# Patient Record
Sex: FEMALE
[De-identification: names, ages, dates, MRNs, and addresses within clinical notes are randomized per-mention and may not be internally consistent; named-entity substitution may affect disease eponyms.]

---

## 2024-07-12 DIAGNOSIS — Z13.83 ENCOUNTER FOR SCREENING FOR RESPIRATORY DISORDER NEC: ICD-10-CM

## 2024-07-12 PROBLEM — Z00.00 ENCOUNTER FOR PREVENTIVE HEALTH EXAMINATION: Status: ACTIVE | Noted: 2024-07-12

## 2024-07-26 ENCOUNTER — APPOINTMENT (OUTPATIENT)
Dept: PULMONOLOGY | Facility: CLINIC | Age: 59
End: 2024-07-26
Payer: COMMERCIAL

## 2024-07-26 VITALS
HEART RATE: 64 BPM | DIASTOLIC BLOOD PRESSURE: 81 MMHG | BODY MASS INDEX: 30.4 KG/M2 | SYSTOLIC BLOOD PRESSURE: 120 MMHG | HEIGHT: 61 IN | WEIGHT: 161 LBS

## 2024-07-26 DIAGNOSIS — J45.20 MILD INTERMITTENT ASTHMA, UNCOMPLICATED: ICD-10-CM

## 2024-07-26 DIAGNOSIS — Z78.9 OTHER SPECIFIED HEALTH STATUS: ICD-10-CM

## 2024-07-26 DIAGNOSIS — Z86.79 PERSONAL HISTORY OF OTHER DISEASES OF THE CIRCULATORY SYSTEM: ICD-10-CM

## 2024-07-26 PROCEDURE — ZZZZZ: CPT

## 2024-07-26 PROCEDURE — 99205 OFFICE O/P NEW HI 60 MIN: CPT | Mod: 25

## 2024-07-26 PROCEDURE — 94060 EVALUATION OF WHEEZING: CPT

## 2024-07-26 PROCEDURE — 94726 PLETHYSMOGRAPHY LUNG VOLUMES: CPT

## 2024-07-26 PROCEDURE — 94729 DIFFUSING CAPACITY: CPT

## 2024-07-26 RX ORDER — ALBUTEROL SULFATE 90 UG/1
108 (90 BASE) INHALANT RESPIRATORY (INHALATION)
Qty: 1 | Refills: 6 | Status: ACTIVE | COMMUNITY
Start: 2024-07-26 | End: 1900-01-01

## 2024-07-27 NOTE — PHYSICAL EXAM
[No Acute Distress] : no acute distress [Well Nourished] : well nourished [Well Groomed] : well groomed [No Deformities] : no deformities [Well Developed] : well developed [Normal Oropharynx] : normal oropharynx [Normal Appearance] : normal appearance [Supple] : supple [No Neck Mass] : no neck mass [Normal Rate/Rhythm] : normal rate/rhythm [Normal S1, S2] : normal s1, s2 [No Resp Distress] : no resp distress [No Acc Muscle Use] : no acc muscle use [Normal Rhythm and Effort] : normal rhythm and effort [Clear to Auscultation Bilaterally] : clear to auscultation bilaterally [Benign] : benign [Soft] : soft [Normal Gait] : normal gait [No Clubbing] : no clubbing [No Cyanosis] : no cyanosis [No Edema] : no edema [FROM] : FROM [Normal Color/ Pigmentation] : normal color/ pigmentation [No Focal Deficits] : no focal deficits [No Motor Deficits] : no motor deficits [Oriented x3] : oriented x3 [Normal Mood] : normal mood [Normal Affect] : normal affect [TextBox_11] : NCAT, EOMI, anicteric, MMM, nares clear, no thrush

## 2024-07-27 NOTE — CONSULT LETTER
[Dear  ___] : Dear  [unfilled], [Consult Letter:] : I had the pleasure of evaluating your patient, [unfilled]. [Please see my note below.] : Please see my note below. [Consult Closing:] : Thank you very much for allowing me to participate in the care of this patient.  If you have any questions, please do not hesitate to contact me. [Sincerely,] : Sincerely, [FreeTextEntry3] : Ramana Berg MD, FACP, FCCP Division of Pulmonary, Critical Care, and Sleep Medicine Associate Professor of Medicine  [employer] at [facility]

## 2024-07-27 NOTE — ASSESSMENT
[FreeTextEntry1] : 58F mild intermittent asthma presenting for initial pulmonary evaluation.  #Mild Intermittent Asthma without complication - PFTs reviewed with patient - there is evidence of small airway disease - Refill for Qvar and Albuterol provided - patient presently not taking any medication regularly and given absence of symptoms it is reasonable to continue monitoring for now - It would be helpful to have prior PFTs for comparison and I have asked patient to sign a records release request - No wheezing or respiratory distress on exam. No role for oral corticosteroids at this time  #Health Maintenance Spirometry: Reviewed Smoking status: Never smoker  Pneumococcal vaccination status: Patient thinks this has been done but does not have records  [place] Sleepiness Scale:  #Follow-up in 6 months or sooner as needed - All questions answered - Extensive discussion with patient about asthma symptoms, diagnostic testing, and treatment options   The above plan was discussed with JAILENE MIRELES  in detail. Patient verbalized understanding and agrees with plan as detailed above. Patient was provided education and counselling on current diagnosis/symptoms, diagnostic work up, treatment options and potential side effects of any prescribed therapy/therapies. JAILENE  was advised to call our clinic at [phone] for any new or worsening symptoms, or with any questions or concerns. In case of acute onset of respiratory symptoms or worsening presentation, patient was advised to present to nearest emergency room for further evaluation. JAILENE  expressed understanding and all questions/concerns were addressed.

## 2024-07-27 NOTE — ASSESSMENT
[FreeTextEntry1] : 58F mild intermittent asthma presenting for initial pulmonary evaluation.  #Mild Intermittent Asthma without complication - PFTs reviewed with patient - there is evidence of small airway disease - Refill for Qvar and Albuterol provided - patient presently not taking any medication regularly and given absence of symptoms it is reasonable to continue monitoring for now - It would be helpful to have prior PFTs for comparison and I have asked patient to sign a records release request - No wheezing or respiratory distress on exam. No role for oral corticosteroids at this time  #Health Maintenance Spirometry: Reviewed Smoking status: Never smoker  Pneumococcal vaccination status: Patient thinks this has been done but does not have records  [place] Sleepiness Scale:  #Follow-up in 6 months or sooner as needed - All questions answered - Extensive discussion with patient about asthma symptoms, diagnostic testing, and treatment options   The above plan was discussed with JAILENE MIRELES  in detail. Patient verbalized understanding and agrees with plan as detailed above. Patient was provided education and counselling on current diagnosis/symptoms, diagnostic work up, treatment options and potential side effects of any prescribed therapy/therapies. JAILENE  was advised to call our clinic at [phone] for any new or worsening symptoms, or with any questions or concerns. In case of acute onset of respiratory symptoms or worsening presentation, patient was advised to present to nearest emergency room for further evaluation. JAILENE  expressed understanding and all questions/concerns were addressed.

## 2024-07-27 NOTE — CONSULT LETTER
[Dear  ___] : Dear  [unfilled], [Consult Letter:] : I had the pleasure of evaluating your patient, [unfilled]. [Please see my note below.] : Please see my note below. [Consult Closing:] : Thank you very much for allowing me to participate in the care of this patient.  If you have any questions, please do not hesitate to contact me. [Sincerely,] : Sincerely, [FreeTextEntry3] : Ramana Berg MD, FACP, FCCP Division of Pulmonary, Critical Care, and Sleep Medicine Associate Professor of Medicine  [employer] at [facility]

## 2024-07-27 NOTE — HISTORY OF PRESENT ILLNESS
[Never] : never [Difficulty Breathing During Exertion] : dyspnea on exertion [Feelings Of Weakness On Exertion] : exercise intolerance [Cough] : coughing [Wheezing] : wheezing [Nasal Passage Blockage (Stuffiness)] : edema [Nonspecific Pain, Swelling, And Stiffness] : chest pain [Fever] : fever [0  -  Nothing at all] : 0, nothing at all [TextBox_4] : 58F mild intermittent asthma presenting for initial pulmonary evaluation.  The patient was previously followed by Dr. Prasanna Magallon who is now retired. She was managed with Qvar and Albuterol PRN for her asthma. She has not had any hospitalizations or urgent care visits. She has never required intubation for her asthma. Her asthma symptoms are very mild and she does not use any of her medications regularly. She is presenting for evaluation and medication refills just in case. - We do not have records from Dr. Guillen's office or her PMD (Dr. Guerrero - [facility]) available for review today. Patient was requested to sign a records release request in hopes that we can try and obtain these records.   She denies cough, sputum production, wheezing, or hemoptysis. She denies fevers, chills, sick contacts, chest pain, or palpitations. She denies any weight loss or night sweats.  She is a never smoker. She is presently taking care of her  who has cancer.  She thinks she may have had a pneumococcal vaccine in the past but is unsure.  PFTs: 7/26/24 - FEV1 2.19L (95%), FVC 3.15L (109%), FEV1/FVC 69%, FEF 25-75 52%, TLC 97%, RV 67%, RV/TLC 27%, DLCO 97% - no significant BD response (28% BD response in FEF 25-75)

## 2024-07-27 NOTE — REVIEW OF SYSTEMS
[Seasonal Allergies] : seasonal allergies [Fever] : no fever [Fatigue] : no fatigue [EDS] : no EDS [Chills] : no chills [Dry Eyes] : no dry eyes [Epistaxis] : no epistaxis [Sore Throat] : no sore throat [Nasal Congestion] : no nasal congestion [Postnasal Drip] : no postnasal drip [Cough] : no cough [Hemoptysis] : no hemoptysis [Chest Tightness] : no chest tightness [Sputum] : no sputum [Frequent URIs] : no frequent URIs [Dyspnea] : no dyspnea [Wheezing] : no wheezing [Chest Discomfort] : no chest discomfort [Claudication] : no claudication [Edema] : no edema [Watery Eyes] : no watery eyes [Itchy Eyes] : no itchy eyes [GERD] : no gerd [Abdominal Pain] : no abdominal pain [Nausea] : no nausea [Vomiting] : no vomiting [Arthralgias] : no arthralgias [Myalgias] : no myalgias [Headache] : no headache [Focal Weakness] : no focal weakness [Seizures] : no seizures [Head Injury] : no head injury [Depression] : no depression [Diabetes] : no diabetes [Anxiety] : no anxiety [Thyroid Problem] : no thyroid problem [Obesity] : no obesity

## 2024-07-27 NOTE — HISTORY OF PRESENT ILLNESS
[Never] : never [Difficulty Breathing During Exertion] : dyspnea on exertion [Feelings Of Weakness On Exertion] : exercise intolerance [Cough] : coughing [Wheezing] : wheezing [Nasal Passage Blockage (Stuffiness)] : edema [Nonspecific Pain, Swelling, And Stiffness] : chest pain [Fever] : fever [0  -  Nothing at all] : 0, nothing at all [TextBox_4] : 58F mild intermittent asthma presenting for initial pulmonary evaluation.  The patient was previously followed by Dr. Prasanna Magallon who is now retired. She was managed with Qvar and Albuterol PRN for her asthma. She has not had any hospitalizations or urgent care visits. She has never required intubation for her asthma. Her asthma symptoms are very mild and she does not use any of her medications regularly. She is presenting for evaluation and medication refills just in case. - We do not have records from Dr. Guillen's office or her PMD (Dr. Guerrero - [facility]) available for review today. Patient was requested to sign a records release request in hopes that we can try and obtain these records.   She denies cough, sputum production, wheezing, or hemoptysis. She denies fevers, chills, sick contacts, chest pain, or palpitations. She denies any weight loss or night sweats.  She is a never smoker. She is presently taking care of her  who has cancer.  She thinks she may have had a pneumococcal vaccine in the past but is unsure.  PFTs: 7/26/24 - FEV1 2.19L (95%), FVC 3.15L (109%), FEV1/FVC 69%, FEF 25-75 52%, TLC 97%, RV 67%, RV/TLC 27%, DLCO 97% - no significant BD response (28% BD response in FEF 25-75)

## 2024-07-27 NOTE — REVIEW OF SYSTEMS
[Seasonal Allergies] : seasonal allergies [Fever] : no fever [Fatigue] : no fatigue [EDS] : no EDS [Chills] : no chills [Dry Eyes] : no dry eyes [Epistaxis] : no epistaxis [Sore Throat] : no sore throat [Nasal Congestion] : no nasal congestion [Postnasal Drip] : no postnasal drip [Cough] : no cough [Hemoptysis] : no hemoptysis [Chest Tightness] : no chest tightness [Sputum] : no sputum [Frequent URIs] : no frequent URIs [Dyspnea] : no dyspnea [Wheezing] : no wheezing [Chest Discomfort] : no chest discomfort [Claudication] : no claudication [Edema] : no edema [Watery Eyes] : no watery eyes [Itchy Eyes] : no itchy eyes [GERD] : no gerd [Abdominal Pain] : no abdominal pain [Nausea] : no nausea [Vomiting] : no vomiting [Arthralgias] : no arthralgias [Myalgias] : no myalgias [Headache] : no headache [Focal Weakness] : no focal weakness [Seizures] : no seizures [Head Injury] : no head injury [Depression] : no depression [Anxiety] : no anxiety [Diabetes] : no diabetes [Thyroid Problem] : no thyroid problem [Obesity] : no obesity

## 2024-07-29 RX ORDER — BECLOMETHASONE DIPROPIONATE HFA 80 UG/1
80 AEROSOL, METERED RESPIRATORY (INHALATION)
Qty: 1 | Refills: 6 | Status: DISCONTINUED | COMMUNITY
Start: 2024-07-26 | End: 2024-07-29

## 2024-07-30 RX ORDER — FLUTICASONE PROPIONATE 110 UG/1
110 AEROSOL, METERED RESPIRATORY (INHALATION) TWICE DAILY
Qty: 1 | Refills: 3 | Status: DISCONTINUED | COMMUNITY
Start: 2024-07-29 | End: 2024-07-30

## 2024-07-30 RX ORDER — BUDESONIDE 90 UG/1
90 AEROSOL, POWDER RESPIRATORY (INHALATION)
Qty: 1 | Refills: 3 | Status: ACTIVE | COMMUNITY
Start: 2024-07-30 | End: 1900-01-01

## 2025-04-25 ENCOUNTER — APPOINTMENT (OUTPATIENT)
Dept: PULMONOLOGY | Facility: CLINIC | Age: 60
End: 2025-04-25
Payer: COMMERCIAL

## 2025-04-25 VITALS
BODY MASS INDEX: 32.3 KG/M2 | RESPIRATION RATE: 16 BRPM | SYSTOLIC BLOOD PRESSURE: 136 MMHG | WEIGHT: 171.06 LBS | OXYGEN SATURATION: 98 % | HEIGHT: 61 IN | DIASTOLIC BLOOD PRESSURE: 84 MMHG | HEART RATE: 69 BPM

## 2025-04-25 PROCEDURE — 99214 OFFICE O/P EST MOD 30 MIN: CPT

## 2025-04-25 PROCEDURE — G2211 COMPLEX E/M VISIT ADD ON: CPT | Mod: NC

## 2025-04-28 DIAGNOSIS — J45.20 MILD INTERMITTENT ASTHMA, UNCOMPLICATED: ICD-10-CM

## 2025-04-28 RX ORDER — MOMETASONE FUROATE 100 UG/1
100 AEROSOL RESPIRATORY (INHALATION)
Qty: 1 | Refills: 5 | Status: ACTIVE | COMMUNITY
Start: 2025-04-28 | End: 1900-01-01